# Patient Record
Sex: FEMALE | Race: WHITE | NOT HISPANIC OR LATINO | Employment: UNEMPLOYED | URBAN - NONMETROPOLITAN AREA
[De-identification: names, ages, dates, MRNs, and addresses within clinical notes are randomized per-mention and may not be internally consistent; named-entity substitution may affect disease eponyms.]

---

## 2022-09-12 ENCOUNTER — OFFICE VISIT (OUTPATIENT)
Dept: GASTROENTEROLOGY | Facility: CLINIC | Age: 57
End: 2022-09-12

## 2022-09-12 VITALS — HEIGHT: 66 IN

## 2022-09-12 DIAGNOSIS — K59.04 CHRONIC IDIOPATHIC CONSTIPATION: ICD-10-CM

## 2022-09-12 DIAGNOSIS — R19.4 CHANGE IN BOWEL HABITS: ICD-10-CM

## 2022-09-12 DIAGNOSIS — R19.5 POSITIVE COLORECTAL CANCER SCREENING USING COLOGUARD TEST: Primary | ICD-10-CM

## 2022-09-12 PROCEDURE — 99204 OFFICE O/P NEW MOD 45 MIN: CPT | Performed by: PHYSICIAN ASSISTANT

## 2022-09-12 RX ORDER — MONTELUKAST SODIUM 4 MG/1
1 TABLET, CHEWABLE ORAL DAILY
Qty: 30 TABLET | Refills: 11 | Status: SHIPPED | OUTPATIENT
Start: 2022-09-12

## 2022-09-12 RX ORDER — POLYETHYLENE GLYCOL 3350 17 G/17G
510 POWDER, FOR SOLUTION ORAL ONCE
Qty: 510 G | Refills: 0 | Status: SHIPPED | OUTPATIENT
Start: 2022-09-12 | End: 2022-09-12

## 2022-09-12 RX ORDER — PANTOPRAZOLE SODIUM 40 MG/1
TABLET, DELAYED RELEASE ORAL
COMMUNITY

## 2022-09-12 RX ORDER — LISINOPRIL 10 MG/1
10 TABLET ORAL DAILY
COMMUNITY
Start: 2022-06-16

## 2022-09-12 RX ORDER — LEVOTHYROXINE SODIUM 112 UG/1
TABLET ORAL
COMMUNITY
Start: 2022-06-16

## 2022-09-12 RX ORDER — TRAMADOL HYDROCHLORIDE 50 MG/1
50 TABLET ORAL EVERY 6 HOURS PRN
COMMUNITY

## 2022-09-12 RX ORDER — BISACODYL 5 MG/1
20 TABLET, DELAYED RELEASE ORAL ONCE
Qty: 4 TABLET | Refills: 0 | Status: SHIPPED | OUTPATIENT
Start: 2022-09-12 | End: 2022-09-12

## 2022-09-12 NOTE — PROGRESS NOTES
Chief Complaint   Patient presents with   • abnormal cologuard       Ira Soto is a 57 y.o. female who presents to the office today for evaluation of abnormal cologuard  .    HPI  Patient presents to the clinic today for evaluation of an abnormal/positive Cologuard screening.  Patient states that back in May 2022 she underwent Cologuard testing that came back positive.  She states that she is not experiencing any bright red blood per rectum or melena.  She has never had a colonoscopy performed in the past therefore is unsure about colon polyps.  She denies any colon cancer in the family however states that she has a history of stomach cancer in her father as well as a paternal uncle.  Her biggest complaint today is alternating bowel habits ranging from constipation to diarrhea.  Constipation currently seems to be the worst.  She is going several days in between bowel movements.  She does have to take stool softeners typically 2 produce a small volume bowel movement.  When bowel movements do occur patient states she ends up having a lot of burning/pain/raw skin around the rectum.  She will have to bathe after a bowel movement and then make sure her skin is completely dry.  Otherwise this causes her significant discomfort.  She does currently have her gallbladder intact.  Has never been on any prescription medications for constipation.  Believes the constipation to be related to medication she is currently taking for her RA.  Review of Systems   Constitutional: Negative for fever.   HENT: Negative for trouble swallowing.    Eyes: Negative.    Respiratory: Negative.    Cardiovascular: Negative.    Gastrointestinal: Positive for abdominal distention, abdominal pain, anal bleeding, constipation, diarrhea and nausea. Negative for blood in stool and vomiting.   Endocrine: Negative.    Genitourinary: Negative for difficulty urinating.   Musculoskeletal: Negative.    Skin: Negative.    Allergic/Immunologic: Negative.   "  Neurological: Negative for headaches.   Hematological: Bruises/bleeds easily.   Psychiatric/Behavioral: Negative.        ACTIVE PROBLEMS:   Specialty Problems    None         PAST MEDICAL HISTORY:  Past Medical History:   Diagnosis Date   • Hypertension        SURGICAL HISTORY:  Past Surgical History:   Procedure Laterality Date   • CARPAL TUNNEL RELEASE     •  SECTION         FAMILY HISTORY:  Family History   Problem Relation Age of Onset   • Cancer Father    • Colon cancer Paternal Cousin        SOCIAL HISTORY:  Social History     Tobacco Use   • Smoking status: Never Smoker   • Smokeless tobacco: Never Used   Substance Use Topics   • Alcohol use: Never       CURRENT MEDICATION:    Current Outpatient Medications:   •  levothyroxine (SYNTHROID, LEVOTHROID) 112 MCG tablet, TAKE 1 TABLET BY MOUTH EVERY MORNING ON EMPTY STOMACH, Disp: , Rfl:   •  lisinopril (PRINIVIL,ZESTRIL) 10 MG tablet, Take 10 mg by mouth Daily., Disp: , Rfl:   •  pantoprazole (PROTONIX) 40 MG EC tablet, pantoprazole 40 mg tablet,delayed release  Take 1 tablet every day by oral route for 90 days., Disp: , Rfl:   •  traMADol (ULTRAM) 50 MG tablet, Take 50 mg by mouth Every 6 (Six) Hours As Needed for Moderate Pain., Disp: , Rfl:   •  bisacodyl (DULCOLAX) 5 MG EC tablet, Take 4 tablets by mouth 1 (One) Time for 1 dose. Take 4 tablets at 4:00 PM the day before procedure, Disp: 4 tablet, Rfl: 0  •  colestipol (COLESTID) 1 g tablet, Take 1 tablet by mouth Daily., Disp: 30 tablet, Rfl: 11  •  linaclotide (LINZESS) 145 MCG capsule capsule, Take 1 capsule by mouth Every Morning Before Breakfast., Disp: 90 capsule, Rfl: 3  •  polyethylene glycol (MIRALAX) 17 GM/SCOOP powder, Take 510 g by mouth 1 (One) Time for 1 dose. Place 15 cap fulls of powder in 32 oz of liquid of choice at 4:00 and 10:00 PM, Disp: 510 g, Rfl: 0    ALLERGIES:  Patient has no known allergies.    VISIT VITALS:  Ht 167.6 cm (66\")   Physical Exam  Constitutional:       General: " She is not in acute distress.     Appearance: Normal appearance. She is well-developed.   HENT:      Head: Normocephalic and atraumatic.   Eyes:      Pupils: Pupils are equal, round, and reactive to light.   Cardiovascular:      Rate and Rhythm: Normal rate and regular rhythm.      Heart sounds: Normal heart sounds.   Pulmonary:      Effort: Pulmonary effort is normal. No respiratory distress.      Breath sounds: Normal breath sounds. No wheezing, rhonchi or rales.   Abdominal:      General: Abdomen is flat. Bowel sounds are normal. There is no distension.      Palpations: Abdomen is soft. There is no mass.      Tenderness: There is no abdominal tenderness. There is no guarding or rebound.      Hernia: No hernia is present.   Musculoskeletal:         General: No swelling. Normal range of motion.      Cervical back: Normal range of motion and neck supple.      Right lower leg: No edema.      Left lower leg: No edema.   Skin:     General: Skin is warm and dry.   Neurological:      Mental Status: She is alert and oriented to person, place, and time.   Psychiatric:         Attention and Perception: Attention normal.         Mood and Affect: Mood normal.         Speech: Speech normal.         Behavior: Behavior normal. Behavior is cooperative.         Thought Content: Thought content normal.         Assessment    Due to the patient's positive Cologuard test-I will go ahead and have her set up for a screening colonoscopy to be performed by Dr. Lamas.  We will be using a MiraLAX colon prep.  Procedure was thoroughly explained to the patient voiced understanding and agreement to the treatment plan.  In regards to her chronic constipation-she is going to be prescribed Linzess 145 mcg once daily.  She was provided samples in clinic.  We are going to do a trial run of Colestid 1 g once daily to see if this will help with the acidic stools that she is currently experiencing.   Diagnosis Plan   1. Positive colorectal cancer  screening using Cologuard test  bisacodyl (DULCOLAX) 5 MG EC tablet    polyethylene glycol (MIRALAX) 17 GM/SCOOP powder    Case Request    Case Request   2. Chronic idiopathic constipation  linaclotide (LINZESS) 145 MCG capsule capsule    bisacodyl (DULCOLAX) 5 MG EC tablet    polyethylene glycol (MIRALAX) 17 GM/SCOOP powder    Case Request    Case Request   3. Change in bowel habits  colestipol (COLESTID) 1 g tablet    bisacodyl (DULCOLAX) 5 MG EC tablet    polyethylene glycol (MIRALAX) 17 GM/SCOOP powder       Return After procedure.               This document has been electronically signed by Jona De La Cruz PA-C  September 12, 2022 12:38 EDT    Part of this note may be an electronic transcription/translation of spoken language to printed text using the Dragon Dictation System.

## 2022-09-21 ENCOUNTER — TELEPHONE (OUTPATIENT)
Dept: GASTROENTEROLOGY | Facility: CLINIC | Age: 57
End: 2022-09-21

## 2022-12-07 ENCOUNTER — TELEPHONE (OUTPATIENT)
Dept: UROLOGY | Facility: CLINIC | Age: 57
End: 2022-12-07

## 2022-12-09 DIAGNOSIS — K59.04 CHRONIC IDIOPATHIC CONSTIPATION: ICD-10-CM

## 2023-01-18 PROBLEM — K59.04 CHRONIC IDIOPATHIC CONSTIPATION: Status: ACTIVE | Noted: 2023-01-18

## 2023-01-18 PROBLEM — R19.5 POSITIVE COLORECTAL CANCER SCREENING USING COLOGUARD TEST: Status: ACTIVE | Noted: 2023-01-18

## 2023-01-19 DIAGNOSIS — K59.04 CHRONIC IDIOPATHIC CONSTIPATION: Primary | ICD-10-CM

## 2023-01-19 RX ORDER — BISACODYL 5 MG/1
20 TABLET, DELAYED RELEASE ORAL ONCE
Qty: 4 TABLET | Refills: 0 | Status: SHIPPED | OUTPATIENT
Start: 2023-01-19 | End: 2023-01-19

## 2023-01-19 RX ORDER — POLYETHYLENE GLYCOL 3350 17 G/17G
510 POWDER, FOR SOLUTION ORAL ONCE
Qty: 510 G | Refills: 0 | Status: SHIPPED | OUTPATIENT
Start: 2023-01-19 | End: 2023-01-19

## 2023-02-27 ENCOUNTER — TELEPHONE (OUTPATIENT)
Dept: UROLOGY | Facility: CLINIC | Age: 58
End: 2023-02-27
Payer: COMMERCIAL

## 2023-02-27 NOTE — TELEPHONE ENCOUNTER
The patient has a colonoscopy scheduled and thinks she has COVID. She would like to get it rescheduled.

## 2023-03-28 ENCOUNTER — TELEPHONE (OUTPATIENT)
Dept: GASTROENTEROLOGY | Facility: CLINIC | Age: 58
End: 2023-03-28
Payer: COMMERCIAL